# Patient Record
Sex: FEMALE | Race: AMERICAN INDIAN OR ALASKA NATIVE
[De-identification: names, ages, dates, MRNs, and addresses within clinical notes are randomized per-mention and may not be internally consistent; named-entity substitution may affect disease eponyms.]

---

## 2020-10-19 ENCOUNTER — HOSPITAL ENCOUNTER (EMERGENCY)
Dept: HOSPITAL 5 - ED | Age: 55
LOS: 1 days | Discharge: HOME | End: 2020-10-20
Payer: COMMERCIAL

## 2020-10-19 VITALS — DIASTOLIC BLOOD PRESSURE: 81 MMHG | SYSTOLIC BLOOD PRESSURE: 174 MMHG

## 2020-10-19 DIAGNOSIS — Z79.899: ICD-10-CM

## 2020-10-19 DIAGNOSIS — Z98.51: ICD-10-CM

## 2020-10-19 DIAGNOSIS — E78.00: ICD-10-CM

## 2020-10-19 DIAGNOSIS — I10: ICD-10-CM

## 2020-10-19 DIAGNOSIS — R07.9: Primary | ICD-10-CM

## 2020-10-19 LAB
ALBUMIN SERPL-MCNC: 4.6 G/DL (ref 3.9–5)
ALT SERPL-CCNC: 41 UNITS/L (ref 7–56)
APTT BLD: 28.3 SEC. (ref 24.2–36.6)
BASOPHILS # (AUTO): 0 K/MM3 (ref 0–0.1)
BASOPHILS NFR BLD AUTO: 0.2 % (ref 0–1.8)
BUN SERPL-MCNC: 15 MG/DL (ref 7–17)
BUN/CREAT SERPL: 25 %
CALCIUM SERPL-MCNC: 9.8 MG/DL (ref 8.4–10.2)
EOSINOPHIL # BLD AUTO: 0.1 K/MM3 (ref 0–0.4)
EOSINOPHIL NFR BLD AUTO: 1.3 % (ref 0–4.3)
HCT VFR BLD CALC: 35.4 % (ref 30.3–42.9)
HEMOLYSIS INDEX: 5
HGB BLD-MCNC: 11.7 GM/DL (ref 10.1–14.3)
INR PPP: 0.95 (ref 0.87–1.13)
LYMPHOCYTES # BLD AUTO: 2.2 K/MM3 (ref 1.2–5.4)
LYMPHOCYTES NFR BLD AUTO: 29.8 % (ref 13.4–35)
MCHC RBC AUTO-ENTMCNC: 33 % (ref 30–34)
MCV RBC AUTO: 89 FL (ref 79–97)
MONOCYTES # (AUTO): 0.6 K/MM3 (ref 0–0.8)
MONOCYTES % (AUTO): 8.9 % (ref 0–7.3)
PLATELET # BLD: 216 K/MM3 (ref 140–440)
RBC # BLD AUTO: 4 M/MM3 (ref 3.65–5.03)

## 2020-10-19 PROCEDURE — 85730 THROMBOPLASTIN TIME PARTIAL: CPT

## 2020-10-19 PROCEDURE — 85610 PROTHROMBIN TIME: CPT

## 2020-10-19 PROCEDURE — 71046 X-RAY EXAM CHEST 2 VIEWS: CPT

## 2020-10-19 PROCEDURE — 85025 COMPLETE CBC W/AUTO DIFF WBC: CPT

## 2020-10-19 PROCEDURE — 93005 ELECTROCARDIOGRAM TRACING: CPT

## 2020-10-19 PROCEDURE — 85379 FIBRIN DEGRADATION QUANT: CPT

## 2020-10-19 PROCEDURE — 71275 CT ANGIOGRAPHY CHEST: CPT

## 2020-10-19 PROCEDURE — 80053 COMPREHEN METABOLIC PANEL: CPT

## 2020-10-19 PROCEDURE — 36415 COLL VENOUS BLD VENIPUNCTURE: CPT

## 2020-10-19 PROCEDURE — 99284 EMERGENCY DEPT VISIT MOD MDM: CPT

## 2020-10-19 PROCEDURE — 84484 ASSAY OF TROPONIN QUANT: CPT

## 2020-10-19 NOTE — XRAY REPORT
CHEST 2 VIEWS 1845



INDICATION / CLINICAL INFORMATION: Chest Pain



COMPARISON: None available.



FINDINGS:



SUPPORT DEVICES: None.



HEART / MEDIASTINUM: No significant abnormality. 



LUNGS / PLEURA: Patient's brassiere was left in place. Lung fields appear clear. No pleural effusions
 are seen. No pneumothorax. 



ADDITIONAL FINDINGS: No significant additional findings.



IMPRESSION: No significant acute abnormality



Signer Name: Alvarez Brito MD 

Signed: 10/19/2020 7:07 PM

Workstation Name: [id number]

## 2020-10-19 NOTE — EVENT NOTE
ED Screening Note


Date of service: 10/19/20


Time: 18:30


ED Screening Note: 


c/o right sided chest pressure and pain x 6 days


hx of HTN


denies SOB





PE:


RRR


A&O x 3 


This initial assessment/diagnostic orders/clinical plan/treatment(s) is/are 

subject to change based on patients health status, clinical progression and re-

assessment by fellow clinical providers in the ED. Further treatment and workup 

at subsequent clinical providers discretion. Patient/guardian urged not to elope

from the ED as their condition may be serious if not clinically assessed and 

managed. 





Initial orders include: 


labs


ekg


cxr

## 2020-10-19 NOTE — CAT SCAN REPORT
CTA of the chest with 3D Reconstruction







Indication:

,P.E. PROTOCOL!!  RIGHT sided chest pain with elevated D-dimer



Technique: 

TECHNIQUE: Axial CT images were obtained through the chest after injection of 100 cc of Omnipaque 350
 IV contrast. 3 plane MIP reconstructions were produced. All CT scans at this location are performed 
using CT dose reduction for ALARA by means of automated exposure control.



COMPARISON:

None







 Automatic exposure control was utilized in an attempt to reduce radiation dose.



Findings:



Pulmonary arteries: The main pulmonary artery and right and left pulmonary artery branches fill satis
factorily with contrast. No pulmonary embolus is seen.

Lungs: The lungs are clear.

Mediastinum: Heart size is normal.  No adenopathy is seen.

Aorta: Normal in diameter.  No dissection seen within limits of this exam.









Impression:

No pulmonary embolus is seen 



Signer Name: Bud Morin MD 

Signed: 10/19/2020 11:53 PM

Workstation Name: [id number]

## 2020-10-19 NOTE — EMERGENCY DEPARTMENT REPORT
ED Chest Pain HPI





- General


Chief Complaint: Chest Pain


Stated Complaint: CHEST PAINS


Time Seen by Provider: 10/19/20 18:27


Source: patient


Mode of arrival: Ambulatory


Limitations: No Limitations





- History of Present Illness


Initial Comments: 





Patient is 55 years old female with history of hypertension and high 

cholesterol.  Patient presented to the ER complaining of chest pain described as

substernal and to the right side of the chest also.  Patient denied any left 

sided chest pain.  She also denies any shortness of breath.  No fever chills or 

cough recently.


MD Complaint: chest pain


-: days(s) (3)


Onset: during rest


Pain Location: substernal, right chest


Pain Radiation: none


Severity: moderate


Severity scale (0 -10): 5


Quality: pressure


Consistency: intermittent





- Related Data


                                  Previous Rx's











 Medication  Instructions  Recorded  Last Taken  Type


 


Acetaminophen/Codeine 1 tab PO Q6H PRN #20 tab 10/27/14 Unknown Rx





[Acetaminophen-Codeine #3 TAB]    


 


Acetaminophen/Codeine [Tylenol #3] 1 tab PO Q8H PRN #12 tablet 09/16/15 Unknown 

Rx


 


Fluticasone [Flonase] 1 spray NS QDAY #1 bottle 09/16/15 Unknown Rx


 


methylPREDNISolone [Medrol Dose 4 mg PO QAM #1 pack 09/16/15 Unknown Rx





Ash]    


 


traMADoL [Ultram] 50 mg PO Q6HR PRN #20 tablet 10/20/20 Unknown Rx











                                    Allergies











Allergy/AdvReac Type Severity Reaction Status Date / Time


 


No Known Allergies Allergy   Unverified 10/27/14 10:43














Heart Score





- HEART Score


History: Slightly suspicious


EKG: Non-specific


Age: 45-65


Risk factors: 1-2 risk factors


Troponin: < normal limit


HEART Score: 3





- Critical Actions


Critical Actions: 0-3 pts:0.9-1.7%risk of adverse cardiac event.Candidate for 

discharge





ED Review of Systems


ROS: 


Stated complaint: CHEST PAINS


Other details as noted in HPI





Comment: All other systems reviewed and negative


Constitutional: denies: chills, fever


Respiratory: denies: cough, shortness of breath, SOB with exertion, SOB at rest


Cardiovascular: chest pain.  denies: palpitations, dyspnea on exertion


Gastrointestinal: denies: abdominal pain, nausea, vomiting, diarrhea, consti

pation, hematemesis, melena, hematochezia


Musculoskeletal: denies: back pain


Neurological: denies: headache, weakness, numbness, paresthesias, confusion





ED Past Medical Hx





- Past Medical History


Previous Medical History?: Yes


Hx Hypertension: Yes





- Surgical History


Past Surgical History?: Yes


Additional Surgical History: tubal ligation





- Social History


Smoking Status: Never Smoker


Substance Use Type: None





- Medications


Home Medications: 


                                Home Medications











 Medication  Instructions  Recorded  Confirmed  Last Taken  Type


 


Acetaminophen/Codeine 1 tab PO Q6H PRN #20 tab 10/27/14  Unknown Rx





[Acetaminophen-Codeine #3 TAB]     


 


Acetaminophen/Codeine [Tylenol #3] 1 tab PO Q8H PRN #12 tablet 09/16/15  Unknown

Rx


 


Fluticasone [Flonase] 1 spray NS QDAY #1 bottle 09/16/15  Unknown Rx


 


methylPREDNISolone [Medrol Dose 4 mg PO QAM #1 pack 09/16/15  Unknown Rx





Ash]     


 


traMADoL [Ultram] 50 mg PO Q6HR PRN #20 tablet 10/20/20  Unknown Rx














ED Physical Exam





- General


Limitations: No Limitations


General appearance: alert, in no apparent distress





- Head


Head exam: Present: atraumatic, normocephalic, normal inspection





- Eye


Eye exam: Present: normal appearance





- ENT


ENT exam: Present: normal exam, normal orophraynx, mucous membranes moist





- Neck


Neck exam: Present: normal inspection, full ROM.  Absent: tenderness, 

meningismus, lymphadenopathy, thyromegaly





- Respiratory


Respiratory exam: Present: normal lung sounds bilaterally





- Cardiovascular


Cardiovascular Exam: Present: regular rate, normal rhythm, normal heart sounds





- GI/Abdominal


GI/Abdominal exam: Present: soft, normal bowel sounds.  Absent: distended, 

tenderness, guarding, rebound, rigid, organomegaly, mass, bruit, pulsatile mass,

hernia





- Extremities Exam


Extremities exam: Present: normal inspection, full ROM, normal capillary refill.

 Absent: pedal edema, calf tenderness





- Back Exam


Back exam: Present: normal inspection, full ROM.  Absent: CVA tenderness (R), 

CVA tenderness (L)





- Neurological Exam


Neurological exam: Present: alert, oriented X3, CN II-XII intact, normal gait, 

reflexes normal.  Absent: motor sensory deficit





- Psychiatric


Psychiatric exam: Present: normal mood





- Skin


Skin exam: Present: warm, intact, normal color





ED Course


                                   Vital Signs











  10/19/20 10/19/20 10/19/20





  18:30 23:28 23:30


 


Temperature 98.4 F 98.2 F 98.2 F


 


Pulse Rate 80 81 79


 


Respiratory 18 16 16





Rate   


 


Blood Pressure 184/99 174/81 


 


Blood Pressure   174/81





[Left]   


 


O2 Sat by Pulse 100 98 99





Oximetry   














  10/20/20





  01:35


 


Temperature 


 


Pulse Rate 70


 


Respiratory 17





Rate 


 


Blood Pressure 


 


Blood Pressure 





[Left] 


 


O2 Sat by Pulse 97





Oximetry 














ED Medical Decision Making





- Lab Data


Result diagrams: 


                                 10/19/20 18:43





                                 10/19/20 18:43





- EKG Data


-: EKG Interpreted by Me


EKG shows normal: sinus rhythm


Rate: normal





- EKG Data


Interpretation: no acute changes





- Radiology Data


Radiology results: report reviewed


Critical care attestation.: 


If time is entered above; I have spent that time in minutes in the direct care 

of this critically ill patient, excluding procedure time.








ED Disposition


Clinical Impression: 


 Chest pain





Disposition: DC-01 TO HOME OR SELFCARE


Is pt being admited?: No


Condition: Stable


Instructions:  Chest Pain (ED)


Additional Instructions: 


CT scan of your chest did not show any evidence of any pulmonary embolism


Prescriptions: 


traMADoL [Ultram] 50 mg PO Q6HR PRN #20 tablet


 PRN Reason: Pain


Referrals: 


DEREK ABREU MD [Staff Physician] - 3-5 Days


PRIMARY CARE,MD [Primary Care Provider] - 3-5 Days